# Patient Record
Sex: FEMALE | Race: WHITE | Employment: OTHER | URBAN - METROPOLITAN AREA
[De-identification: names, ages, dates, MRNs, and addresses within clinical notes are randomized per-mention and may not be internally consistent; named-entity substitution may affect disease eponyms.]

---

## 2017-05-11 ENCOUNTER — TELEPHONE (OUTPATIENT)
Dept: FAMILY MEDICINE CLINIC | Facility: CLINIC | Age: 47
End: 2017-05-11

## 2017-05-11 DIAGNOSIS — R30.0 DYSURIA: Primary | ICD-10-CM

## 2017-05-11 NOTE — TELEPHONE ENCOUNTER
DR CHANG  Actions Requested: Patient asked if you can order Urine culture to r/o UTI  Situation/Background   Problem: discomfort with urination, urgency   Onset: few days ago   Associated Symptoms: denied burning with urination, denied fever, no back pain

## 2017-05-11 NOTE — TELEPHONE ENCOUNTER
Pt called, message per Dr given.   Verbalized understanding and compliance  Will complete 5/12/17 in AM

## 2017-05-12 ENCOUNTER — APPOINTMENT (OUTPATIENT)
Dept: LAB | Facility: HOSPITAL | Age: 47
End: 2017-05-12
Attending: INTERNAL MEDICINE
Payer: COMMERCIAL

## 2017-05-12 DIAGNOSIS — R30.0 DYSURIA: ICD-10-CM

## 2017-05-12 PROCEDURE — 81003 URINALYSIS AUTO W/O SCOPE: CPT

## 2017-05-12 PROCEDURE — 87086 URINE CULTURE/COLONY COUNT: CPT

## 2017-08-07 ENCOUNTER — HOSPITAL ENCOUNTER (EMERGENCY)
Facility: HOSPITAL | Age: 47
Discharge: HOME | End: 2017-08-07
Payer: COMMERCIAL

## 2017-08-07 ENCOUNTER — APPOINTMENT (OUTPATIENT)
Dept: ULTRASOUND IMAGING | Facility: HOSPITAL | Age: 47
End: 2017-08-07
Attending: NURSE PRACTITIONER
Payer: COMMERCIAL

## 2017-08-07 VITALS
RESPIRATION RATE: 20 BRPM | OXYGEN SATURATION: 98 % | HEART RATE: 86 BPM | SYSTOLIC BLOOD PRESSURE: 140 MMHG | DIASTOLIC BLOOD PRESSURE: 89 MMHG | TEMPERATURE: 98 F | BODY MASS INDEX: 22.96 KG/M2 | HEIGHT: 69 IN | WEIGHT: 155 LBS

## 2017-08-07 DIAGNOSIS — S86.112A GASTROCNEMIUS MUSCLE RUPTURE, LEFT, INITIAL ENCOUNTER: Primary | ICD-10-CM

## 2017-08-07 PROCEDURE — 99284 EMERGENCY DEPT VISIT MOD MDM: CPT

## 2017-08-07 PROCEDURE — 93971 EXTREMITY STUDY: CPT | Performed by: NURSE PRACTITIONER

## 2017-08-08 ENCOUNTER — TELEPHONE (OUTPATIENT)
Dept: INTERNAL MEDICINE CLINIC | Facility: CLINIC | Age: 47
End: 2017-08-08

## 2017-08-08 ENCOUNTER — OFFICE VISIT (OUTPATIENT)
Dept: INTERNAL MEDICINE CLINIC | Facility: CLINIC | Age: 47
End: 2017-08-08

## 2017-08-08 VITALS
WEIGHT: 171 LBS | BODY MASS INDEX: 24.76 KG/M2 | DIASTOLIC BLOOD PRESSURE: 82 MMHG | SYSTOLIC BLOOD PRESSURE: 129 MMHG | HEART RATE: 82 BPM | HEIGHT: 69.5 IN | TEMPERATURE: 98 F

## 2017-08-08 DIAGNOSIS — S86.112D STRAIN OF GASTROCNEMIUS MUSCLE OF LEFT LOWER EXTREMITY, SUBSEQUENT ENCOUNTER: Primary | ICD-10-CM

## 2017-08-08 PROBLEM — S86.112A STRAIN OF GASTROCNEMIUS MUSCLE OF LEFT LOWER EXTREMITY: Status: ACTIVE | Noted: 2017-08-08

## 2017-08-08 PROCEDURE — 99214 OFFICE O/P EST MOD 30 MIN: CPT | Performed by: INTERNAL MEDICINE

## 2017-08-08 PROCEDURE — 99212 OFFICE O/P EST SF 10 MIN: CPT | Performed by: INTERNAL MEDICINE

## 2017-08-08 NOTE — ED PROVIDER NOTES
Patient Seen in: [facility] AND [facility] Emergency Department    History   Patient presents with:  Musculoskeletal Problem    Stated Complaint: l calf injury x 2 days    HPI    24-year-old female, with a history of anemia and abnormal periods, presents to the Grandmother 48     per NG: cause of death   • Hypertension Maternal Aunt    • Hypertension Maternal Aunt        Smoking status: Passive Smoke Exposure - Never Smoker                                      Packs/day: 0.00      Years: 1.00         Last attempt Course   Labs Reviewed - No data to display    ============================================================  ED Course  ------------------------------------------------------------  MDM     Ultrasound ordered per triage was negative  Ace wrap and crutches-

## 2017-08-08 NOTE — TELEPHONE ENCOUNTER
Pt calling  States she was in ER on 08/07 and will need follow up appt for  torn muscle in calf with two days. No appt are available please advise pt requesting to be added sooner.

## 2017-08-09 ENCOUNTER — TELEPHONE (OUTPATIENT)
Dept: ORTHOPEDICS CLINIC | Facility: CLINIC | Age: 47
End: 2017-08-09

## 2017-08-09 NOTE — TELEPHONE ENCOUNTER
Dr. Dariana Hernandez, this pt was seen in [address] [place] [address] ED on 08/07/17 for left gastrocmemius muscle rupture.  May I add pt to your schedule today at 6pm?

## 2017-08-09 NOTE — TELEPHONE ENCOUNTER
Spoke to pt and she states her left calf is feeling much better. Denies pain at this time. Pain increases to 4/10 with over extension. States calf has \"dramatically improved\" since she went to ED on 08/07/17. Denies any swelling or bruising.  States she h

## 2017-08-09 NOTE — ASSESSMENT & PLAN NOTE
Pain and cramping in the left calf. DVT studies done in the emergency room were negative. Continues to have difficulty with weightbearing and walking. Advised toe-touch walking at this time and use an Ace wrap from mid thigh to mid calf.   Use crutches i

## 2017-08-09 NOTE — PATIENT INSTRUCTIONS
Problem List Items Addressed This Visit        Unprioritized    Strain of gastrocnemius muscle of left lower extremity - Primary     Pain and cramping in the left calf. DVT studies done in the emergency room were negative.   Continues to have difficulty wi

## 2017-08-09 NOTE — PROGRESS NOTES
HPI:    Patient ID: Katty Soares is a 52year old female. Leg Pain    The pain is present in the left upper leg. This is a new problem. The current episode started in the past 7 days (3 days back).  There has been a history of trauma (started with lef sounds are normal.   Musculoskeletal: Normal range of motion. Left knee: Normal.        Right lower leg: She exhibits tenderness. She exhibits no edema. Neurological: She is alert and oriented to person, place, and time. She has normal reflexes.

## 2018-03-28 ENCOUNTER — OFFICE VISIT (OUTPATIENT)
Dept: OBGYN CLINIC | Facility: CLINIC | Age: 48
End: 2018-03-28

## 2018-03-28 VITALS
SYSTOLIC BLOOD PRESSURE: 142 MMHG | HEART RATE: 84 BPM | WEIGHT: 164 LBS | DIASTOLIC BLOOD PRESSURE: 89 MMHG | BODY MASS INDEX: 24 KG/M2

## 2018-03-28 DIAGNOSIS — D25.1 INTRAMURAL LEIOMYOMA OF UTERUS: ICD-10-CM

## 2018-03-28 DIAGNOSIS — Z12.31 VISIT FOR SCREENING MAMMOGRAM: ICD-10-CM

## 2018-03-28 DIAGNOSIS — Z01.419 ENCOUNTER FOR GYNECOLOGICAL EXAMINATION WITHOUT ABNORMAL FINDING: Primary | ICD-10-CM

## 2018-03-28 PROCEDURE — 99396 PREV VISIT EST AGE 40-64: CPT | Performed by: OBSTETRICS & GYNECOLOGY

## 2018-03-29 LAB — HPV I/H RISK 1 DNA SPEC QL NAA+PROBE: NEGATIVE

## 2018-05-06 ENCOUNTER — HOSPITAL ENCOUNTER (OUTPATIENT)
Dept: MAMMOGRAPHY | Facility: HOSPITAL | Age: 48
Discharge: HOME | End: 2018-05-06
Attending: OBSTETRICS & GYNECOLOGY
Payer: COMMERCIAL

## 2018-05-06 DIAGNOSIS — Z12.31 VISIT FOR SCREENING MAMMOGRAM: ICD-10-CM

## 2018-05-06 PROCEDURE — 77067 SCR MAMMO BI INCL CAD: CPT | Performed by: OBSTETRICS & GYNECOLOGY

## 2018-08-21 ENCOUNTER — TELEPHONE (OUTPATIENT)
Dept: INTERNAL MEDICINE CLINIC | Facility: CLINIC | Age: 48
End: 2018-08-21

## 2018-08-24 NOTE — TELEPHONE ENCOUNTER
Pt returned call, states she has been checking her BP and it averages 130/89. Resting HR over 100 at times. Pt states she has been under some stress lately so may be related to increased stress. Pt has had occasional headaches over the last 3 months, also gets occasional sensation or pain in her left arm down to her fingers, states not present at this time. Denies chest pain, back pain, shortness of breath, numbness or tingling, or visual changes. Appt rescheduled to 8/28/18. Advised pt to go to ER if any symptoms noted or pain in arm occurs again, pt agreed with plan of care.

## 2018-08-28 ENCOUNTER — OFFICE VISIT (OUTPATIENT)
Dept: INTERNAL MEDICINE CLINIC | Facility: CLINIC | Age: 48
End: 2018-08-28

## 2018-08-28 VITALS
BODY MASS INDEX: 23.89 KG/M2 | RESPIRATION RATE: 16 BRPM | HEART RATE: 85 BPM | HEIGHT: 69.5 IN | WEIGHT: 165 LBS | SYSTOLIC BLOOD PRESSURE: 159 MMHG | DIASTOLIC BLOOD PRESSURE: 94 MMHG

## 2018-08-28 DIAGNOSIS — E04.9 GOITER: ICD-10-CM

## 2018-08-28 DIAGNOSIS — E78.2 MIXED HYPERLIPIDEMIA: Primary | ICD-10-CM

## 2018-08-28 DIAGNOSIS — Z00.00 ROUTINE PHYSICAL EXAMINATION: ICD-10-CM

## 2018-08-28 DIAGNOSIS — R03.0 ELEVATED BLOOD PRESSURE, SITUATIONAL: ICD-10-CM

## 2018-08-28 PROCEDURE — 99214 OFFICE O/P EST MOD 30 MIN: CPT | Performed by: INTERNAL MEDICINE

## 2018-08-28 RX ORDER — ALPRAZOLAM 0.25 MG/1
0.25 TABLET ORAL NIGHTLY PRN
Qty: 20 TABLET | Refills: 0 | Status: SHIPPED | OUTPATIENT
Start: 2018-08-28 | End: 2019-12-05

## 2018-08-28 RX ORDER — METOPROLOL SUCCINATE 25 MG/1
TABLET, EXTENDED RELEASE ORAL
Qty: 30 TABLET | Refills: 5 | Status: SHIPPED | OUTPATIENT
Start: 2018-08-28 | End: 2019-11-06

## 2018-08-28 RX ORDER — CLONAZEPAM 0.5 MG/1
TABLET ORAL
Qty: 30 TABLET | Refills: 5 | Status: SHIPPED | OUTPATIENT
Start: 2018-08-28 | End: 2018-09-18

## 2018-08-28 NOTE — ASSESSMENT & PLAN NOTE
Patient has been having increasing problems with anxiety, agitation, irritability. She has been having hot flashes. Menstrual cycles remain regular. Last labs were done quite a while back. Blood pressures even upon recheck remained 135/90.   Patient has

## 2018-08-28 NOTE — PATIENT INSTRUCTIONS
Problem List Items Addressed This Visit        Unprioritized    Elevated blood pressure, situational     Patient has been having increasing problems with anxiety, agitation, irritability. She has been having hot flashes. Menstrual cycles remain regular.

## 2018-08-28 NOTE — PROGRESS NOTES
HPI:    Patient ID: Giovanni Hanley is a 50year old female. Blood Pressure   This is a recurrent problem. The current episode started more than 1 month ago. The problem occurs intermittently. The problem has been waxing and waning.  Associated symptoms in tablet Rfl: 5   ClonazePAM 0.5 MG Oral Tab 1 tablet p.o. nightly Disp: 30 tablet Rfl: 5   ALPRAZolam 0.25 MG Oral Tab Take 1 tablet (0.25 mg total) by mouth nightly as needed for Anxiety.  Disp: 20 tablet Rfl: 0     Allergies:  Penicillins                 C Unprioritized    Routine physical examination    Relevant Orders    CBC WITH DIFFERENTIAL WITH PLATELET    COMP METABOLIC PANEL (14)    URINALYSIS, ROUTINE    VITAMIN B12    VITAMIN D, 25-HYDROXY    ASSAY, THYROID STIM HORMONE    LIPID PANEL    Hyperlipide ALPRAZolam 0.25 MG Oral Tab 20 tablet 0      Sig: Take 1 tablet (0.25 mg total) by mouth nightly as needed for Anxiety.            Imaging & Referrals:  US HEAD/NECK ([id number])       DF#[id number]

## 2018-09-08 ENCOUNTER — HOSPITAL ENCOUNTER (OUTPATIENT)
Dept: ULTRASOUND IMAGING | Facility: HOSPITAL | Age: 48
Discharge: HOME | End: 2018-09-08
Attending: INTERNAL MEDICINE
Payer: COMMERCIAL

## 2018-09-08 ENCOUNTER — LAB ENCOUNTER (OUTPATIENT)
Dept: LAB | Facility: HOSPITAL | Age: 48
End: 2018-09-08
Attending: INTERNAL MEDICINE
Payer: COMMERCIAL

## 2018-09-08 DIAGNOSIS — E04.9 GOITER: ICD-10-CM

## 2018-09-08 DIAGNOSIS — Z00.00 ROUTINE PHYSICAL EXAMINATION: ICD-10-CM

## 2018-09-08 DIAGNOSIS — E78.2 MIXED HYPERLIPIDEMIA: ICD-10-CM

## 2018-09-08 LAB
ALBUMIN SERPL BCP-MCNC: 3.8 G/DL (ref 3.5–4.8)
ALBUMIN/GLOB SERPL: 1.4 {RATIO} (ref 1–2)
ALP SERPL-CCNC: 46 U/L (ref 32–100)
ALT SERPL-CCNC: 12 U/L (ref 14–54)
ANION GAP SERPL CALC-SCNC: 7 MMOL/L (ref 0–18)
AST SERPL-CCNC: 16 U/L (ref 15–41)
BASOPHILS # BLD: 0.1 K/UL (ref 0–0.2)
BASOPHILS NFR BLD: 1 %
BILIRUB SERPL-MCNC: 0.5 MG/DL (ref 0.3–1.2)
BILIRUB UR QL: NEGATIVE
BUN SERPL-MCNC: 17 MG/DL (ref 8–20)
BUN/CREAT SERPL: 20 (ref 10–20)
CALCIUM SERPL-MCNC: 9.2 MG/DL (ref 8.5–10.5)
CHLORIDE SERPL-SCNC: 104 MMOL/L (ref 95–110)
CHOLEST SERPL-MCNC: 189 MG/DL (ref 110–200)
CO2 SERPL-SCNC: 28 MMOL/L (ref 22–32)
COLOR UR: YELLOW
CREAT SERPL-MCNC: 0.85 MG/DL (ref 0.5–1.5)
EOSINOPHIL # BLD: 0.1 K/UL (ref 0–0.7)
EOSINOPHIL NFR BLD: 1 %
ERYTHROCYTE [DISTWIDTH] IN BLOOD BY AUTOMATED COUNT: 14 % (ref 11–15)
GLOBULIN PLAS-MCNC: 2.8 G/DL (ref 2.5–3.7)
GLUCOSE SERPL-MCNC: 91 MG/DL (ref 70–99)
GLUCOSE UR-MCNC: NEGATIVE MG/DL
HCT VFR BLD AUTO: 40.2 % (ref 35–48)
HDLC SERPL-MCNC: 58 MG/DL
HGB BLD-MCNC: 13.4 G/DL (ref 12–16)
HGB UR QL STRIP.AUTO: NEGATIVE
KETONES UR-MCNC: NEGATIVE MG/DL
LDLC SERPL CALC-MCNC: 116 MG/DL (ref 0–99)
LYMPHOCYTES # BLD: 1.2 K/UL (ref 1–4)
LYMPHOCYTES NFR BLD: 21 %
MCH RBC QN AUTO: 30.7 PG (ref 27–32)
MCHC RBC AUTO-ENTMCNC: 33.4 G/DL (ref 32–37)
MCV RBC AUTO: 91.7 FL (ref 80–100)
MONOCYTES # BLD: 0.4 K/UL (ref 0–1)
MONOCYTES NFR BLD: 8 %
NEUTROPHILS # BLD AUTO: 3.9 K/UL (ref 1.8–7.7)
NEUTROPHILS NFR BLD: 69 %
NITRITE UR QL STRIP.AUTO: POSITIVE
NONHDLC SERPL-MCNC: 131 MG/DL
OSMOLALITY UR CALC.SUM OF ELEC: 289 MOSM/KG (ref 275–295)
PATIENT FASTING: YES
PH UR: 5 [PH] (ref 5–8)
PLATELET # BLD AUTO: 256 K/UL (ref 140–400)
PMV BLD AUTO: 9.1 FL (ref 7.4–10.3)
POTASSIUM SERPL-SCNC: 3.7 MMOL/L (ref 3.3–5.1)
PROT SERPL-MCNC: 6.6 G/DL (ref 5.9–8.4)
PROT UR-MCNC: NEGATIVE MG/DL
RBC # BLD AUTO: 4.39 M/UL (ref 3.7–5.4)
RBC #/AREA URNS AUTO: 2 /HPF
SODIUM SERPL-SCNC: 139 MMOL/L (ref 136–144)
SP GR UR STRIP: 1.02 (ref 1–1.03)
TRIGL SERPL-MCNC: 74 MG/DL (ref 1–149)
TSH SERPL-ACNC: 2.48 UIU/ML (ref 0.45–5.33)
UROBILINOGEN UR STRIP-ACNC: <2
VIT B12 SERPL-MCNC: 316 PG/ML (ref 181–914)
VIT C UR-MCNC: NEGATIVE MG/DL
WBC # BLD AUTO: 5.6 K/UL (ref 4–11)
WBC #/AREA URNS AUTO: 25 /HPF

## 2018-09-08 PROCEDURE — 82306 VITAMIN D 25 HYDROXY: CPT

## 2018-09-08 PROCEDURE — 76536 US EXAM OF HEAD AND NECK: CPT | Performed by: INTERNAL MEDICINE

## 2018-09-08 PROCEDURE — 84443 ASSAY THYROID STIM HORMONE: CPT

## 2018-09-08 PROCEDURE — 36415 COLL VENOUS BLD VENIPUNCTURE: CPT

## 2018-09-08 PROCEDURE — 81001 URINALYSIS AUTO W/SCOPE: CPT

## 2018-09-08 PROCEDURE — 80061 LIPID PANEL: CPT

## 2018-09-08 PROCEDURE — 85025 COMPLETE CBC W/AUTO DIFF WBC: CPT

## 2018-09-08 PROCEDURE — 82607 VITAMIN B-12: CPT

## 2018-09-08 PROCEDURE — 80053 COMPREHEN METABOLIC PANEL: CPT

## 2018-09-10 LAB — 25(OH)D3 SERPL-MCNC: 26.1 NG/ML

## 2018-09-18 ENCOUNTER — OFFICE VISIT (OUTPATIENT)
Dept: INTERNAL MEDICINE CLINIC | Facility: CLINIC | Age: 48
End: 2018-09-18

## 2018-09-18 VITALS
DIASTOLIC BLOOD PRESSURE: 89 MMHG | RESPIRATION RATE: 16 BRPM | WEIGHT: 167 LBS | SYSTOLIC BLOOD PRESSURE: 127 MMHG | HEART RATE: 69 BPM | HEIGHT: 69.5 IN | BODY MASS INDEX: 24.18 KG/M2

## 2018-09-18 DIAGNOSIS — E78.2 MIXED HYPERLIPIDEMIA: Primary | ICD-10-CM

## 2018-09-18 DIAGNOSIS — E55.9 VITAMIN D DEFICIENCY: ICD-10-CM

## 2018-09-18 DIAGNOSIS — R03.0 ELEVATED BLOOD PRESSURE, SITUATIONAL: ICD-10-CM

## 2018-09-18 DIAGNOSIS — F32.A DEPRESSIVE DISORDER: ICD-10-CM

## 2018-09-18 PROCEDURE — 99212 OFFICE O/P EST SF 10 MIN: CPT | Performed by: INTERNAL MEDICINE

## 2018-09-18 PROCEDURE — 99214 OFFICE O/P EST MOD 30 MIN: CPT | Performed by: INTERNAL MEDICINE

## 2018-09-18 RX ORDER — ERGOCALCIFEROL 1.25 MG/1
50000 CAPSULE ORAL WEEKLY
Qty: 12 CAPSULE | Refills: 1 | Status: SHIPPED | OUTPATIENT
Start: 2018-09-18 | End: 2019-01-30

## 2018-09-18 RX ORDER — ESCITALOPRAM OXALATE 10 MG/1
10 TABLET ORAL DAILY
Qty: 30 TABLET | Refills: 5 | Status: SHIPPED | OUTPATIENT
Start: 2018-09-18 | End: 2019-11-06

## 2018-09-19 NOTE — ASSESSMENT & PLAN NOTE
Patient with anxiety depressive disorder. Multiple stressors at this time. Could not tolerate the clonazepam as she felt drowsy during the day. Advised to start on a trial of Lexapro 5 mg 1 tablet once daily and increase to 10 mg as tolerated.   May need

## 2018-09-19 NOTE — PATIENT INSTRUCTIONS
Problem List Items Addressed This Visit        Unprioritized    Depressive disorder     Patient with anxiety depressive disorder. Multiple stressors at this time. Could not tolerate the clonazepam as she felt drowsy during the day.   Advised to start on a

## 2018-09-19 NOTE — ASSESSMENT & PLAN NOTE
Started on vitamin D 50,000 units once a week for the next 12 weeks.   Started on B12 supplements 1000 mcg once daily

## 2018-09-24 NOTE — PROGRESS NOTES
HPI:    Patient ID: Janice Bennett is a 50year old female. Blood Pressure   This is a recurrent problem. The current episode started in the past 7 days. The problem occurs intermittently.  Progression since onset: elevated bp off and on when stressed,.ha person, place, and time. She appears well-developed and well-nourished. HENT:   Right Ear: External ear normal.   Left Ear: External ear normal.   Nose: Nose normal.   Mouth/Throat: Oropharynx is clear and moist. No oropharyngeal exudate.    Eyes: Conjunc felt drowsy during the day. Advised to start on a trial of Lexapro 5 mg 1 tablet once daily and increase to 10 mg as tolerated. May need to call to start a taper down if considering stopping the medication.   Take the medication in the morning with a meal

## 2019-01-26 ENCOUNTER — PATIENT MESSAGE (OUTPATIENT)
Dept: INTERNAL MEDICINE CLINIC | Facility: CLINIC | Age: 49
End: 2019-01-26

## 2019-01-27 NOTE — TELEPHONE ENCOUNTER
Review pended refill request as it does not fall under a protocol.     Last Rx: 9-18-18  LOV: 9-18-18

## 2019-01-27 NOTE — TELEPHONE ENCOUNTER
From: Gabe Rodrigez  To: Renate Hua MD  Sent: 1/26/2019 9:50 AM CST  Subject: Prescription Question    Can I get a refill for the Vitamin D that was prescribed to me in September?  It does not allow me to automatically request a refill for this one onli

## 2019-01-30 RX ORDER — ERGOCALCIFEROL 1.25 MG/1
50000 CAPSULE ORAL WEEKLY
Qty: 12 CAPSULE | Refills: 1 | Status: SHIPPED | OUTPATIENT
Start: 2019-01-30 | End: 2019-03-01

## 2019-09-04 ENCOUNTER — OFFICE VISIT (OUTPATIENT)
Dept: OBGYN CLINIC | Facility: CLINIC | Age: 49
End: 2019-09-04

## 2019-09-04 VITALS
SYSTOLIC BLOOD PRESSURE: 124 MMHG | DIASTOLIC BLOOD PRESSURE: 82 MMHG | BODY MASS INDEX: 24.47 KG/M2 | HEART RATE: 90 BPM | WEIGHT: 169 LBS | HEIGHT: 69.5 IN

## 2019-09-04 DIAGNOSIS — D21.9 FIBROIDS: ICD-10-CM

## 2019-09-04 DIAGNOSIS — Z01.419 ENCOUNTER FOR GYNECOLOGICAL EXAMINATION WITHOUT ABNORMAL FINDING: Primary | ICD-10-CM

## 2019-09-04 DIAGNOSIS — Z12.31 VISIT FOR SCREENING MAMMOGRAM: ICD-10-CM

## 2019-09-04 DIAGNOSIS — N92.3 INTERMENSTRUAL BLEEDING: ICD-10-CM

## 2019-09-04 PROCEDURE — 99212 OFFICE O/P EST SF 10 MIN: CPT | Performed by: OBSTETRICS & GYNECOLOGY

## 2019-09-04 PROCEDURE — 99396 PREV VISIT EST AGE 40-64: CPT | Performed by: OBSTETRICS & GYNECOLOGY

## 2019-09-04 NOTE — PROGRESS NOTES
Hoang Jon is a 52year old female [id number] Patient's last menstrual period was 08/24/2019. Patient presents with: Annual  Pt had another period 10 days after finishing her menses in June.   She has a h/o fibroids and I rec ordering an US to check on fi drinks        Comment: weekly       Drug use: No      Sexual activity: Yes        Partners: Male        Birth control/protection: Vasectomy    Lifestyle      Physical activity:        Days per week: Not on file        Minutes per session: Not on file escitalopram 10 MG Oral Tab, Take 1 tablet (10 mg total) by mouth daily. , Disp: 30 tablet, Rfl: 5  •  Metoprolol Succinate ER 25 MG Oral Tablet 24 Hr, 1 tablet p.o. daily at bedtime, Disp: 30 tablet, Rfl: 5  •  ALPRAZolam 0.25 MG Oral Tab, Take 1 tablet (0 Appropriate mood and affect    Pelvic Exam:  External Genitalia: normal appearance, hair distribution, and no lesions  Urethral Meatus:  normal in size, location, without lesions and prolapse  Bladder:  No fullness, masses or tenderness  Vagina:  Normal ap

## 2019-10-29 ENCOUNTER — HOSPITAL ENCOUNTER (OUTPATIENT)
Dept: ULTRASOUND IMAGING | Facility: HOSPITAL | Age: 49
Discharge: HOME | End: 2019-10-29
Attending: OBSTETRICS & GYNECOLOGY
Payer: COMMERCIAL

## 2019-10-29 ENCOUNTER — HOSPITAL ENCOUNTER (OUTPATIENT)
Dept: MAMMOGRAPHY | Facility: HOSPITAL | Age: 49
Discharge: HOME | End: 2019-10-29
Attending: OBSTETRICS & GYNECOLOGY
Payer: COMMERCIAL

## 2019-10-29 DIAGNOSIS — D21.9 FIBROIDS: ICD-10-CM

## 2019-10-29 DIAGNOSIS — Z12.31 VISIT FOR SCREENING MAMMOGRAM: ICD-10-CM

## 2019-10-29 DIAGNOSIS — N92.3 INTERMENSTRUAL BLEEDING: ICD-10-CM

## 2019-10-29 PROCEDURE — 77063 BREAST TOMOSYNTHESIS BI: CPT | Performed by: OBSTETRICS & GYNECOLOGY

## 2019-10-29 PROCEDURE — 76856 US EXAM PELVIC COMPLETE: CPT | Performed by: OBSTETRICS & GYNECOLOGY

## 2019-10-29 PROCEDURE — 76830 TRANSVAGINAL US NON-OB: CPT | Performed by: OBSTETRICS & GYNECOLOGY

## 2019-10-29 PROCEDURE — 77067 SCR MAMMO BI INCL CAD: CPT | Performed by: OBSTETRICS & GYNECOLOGY

## 2019-10-31 ENCOUNTER — TELEPHONE (OUTPATIENT)
Dept: OBGYN CLINIC | Facility: CLINIC | Age: 49
End: 2019-10-31

## 2019-10-31 DIAGNOSIS — N92.3 INTERMENSTRUAL BLEEDING: Primary | ICD-10-CM

## 2019-10-31 DIAGNOSIS — N84.0 ENDOMETRIAL POLYP: ICD-10-CM

## 2019-10-31 NOTE — TELEPHONE ENCOUNTER
OB GYN SURGICAL SCHEDULING    Assessment: menorrhagia, possible endometrial polyp on US  Pre-Operative Procedure:  Hysteroscopy - surgical, possible myosure polypectomy    In / on: day 7-10 of cycle    Admission:  Day Surgery    Anesthesia: General    Additional Orders:  Routine Orders    Comments / Orders to Nurse:     Discussed possible complications including but not limited to: Alternatives to surgical intervention discussed with patient in detail., Likely consequences of not undergoing procedure discussed with patient. , anesthesia risks, Ashermann's syndrome, bleeding, infection, injury, internal, need for future surgery, perforation of uterus and surgery may not improve symptoms

## 2019-10-31 NOTE — TELEPHONE ENCOUNTER
Spoke with pt regarding results and rec emb- pt had this before and does not want it done in office- will schedule hys/D&C, poss polypectomy

## 2019-10-31 NOTE — TELEPHONE ENCOUNTER
Pt preferred dates 11/7-11/8. Patient advised I will follow up with her tomorrow with a confirmation.

## 2019-11-01 NOTE — TELEPHONE ENCOUNTER
Patient is scheduled 11/8/19 9:30am hysteroscopy w/possible myosure polypectomy [facility]. Pat orders were routed. Instructions were routed and a detailed message was left on pt's vmail per pt's fyi. Pt asked to c/ to confirm the message was received.

## 2019-11-07 NOTE — TELEPHONE ENCOUNTER
Contacted pt and informed her that [facility] had a family emergency and had to cancel procedure. Patient was understanding and will call the first day of her next cycle to [facility].

## 2019-12-05 ENCOUNTER — ANESTHESIA EVENT (OUTPATIENT)
Dept: SURGERY | Facility: HOSPITAL | Age: 49
End: 2019-12-05
Payer: COMMERCIAL

## 2019-12-05 ENCOUNTER — HOSPITAL ENCOUNTER (OUTPATIENT)
Facility: HOSPITAL | Age: 49
Setting detail: HOSPITAL OUTPATIENT SURGERY
Discharge: HOME | End: 2019-12-05
Attending: OBSTETRICS & GYNECOLOGY | Admitting: OBSTETRICS & GYNECOLOGY
Payer: COMMERCIAL

## 2019-12-05 ENCOUNTER — ANESTHESIA (OUTPATIENT)
Dept: SURGERY | Facility: HOSPITAL | Age: 49
End: 2019-12-05
Payer: COMMERCIAL

## 2019-12-05 VITALS
BODY MASS INDEX: 25.03 KG/M2 | SYSTOLIC BLOOD PRESSURE: 135 MMHG | TEMPERATURE: 98 F | HEIGHT: 69 IN | RESPIRATION RATE: 18 BRPM | OXYGEN SATURATION: 98 % | HEART RATE: 76 BPM | DIASTOLIC BLOOD PRESSURE: 93 MMHG | WEIGHT: 169 LBS

## 2019-12-05 DIAGNOSIS — N92.3 INTERMENSTRUAL BLEEDING: ICD-10-CM

## 2019-12-05 DIAGNOSIS — N84.0 ENDOMETRIAL POLYP: ICD-10-CM

## 2019-12-05 PROBLEM — N93.9 ABNORMAL UTERINE BLEEDING DUE TO ENDOMETRIAL POLYP: Status: ACTIVE | Noted: 2019-12-05

## 2019-12-05 PROBLEM — N92.1 MENORRHAGIA WITH IRREGULAR CYCLE: Status: ACTIVE | Noted: 2019-12-05

## 2019-12-05 PROCEDURE — 0UDB8ZZ EXTRACTION OF ENDOMETRIUM, VIA NATURAL OR ARTIFICIAL OPENING ENDOSCOPIC: ICD-10-PCS | Performed by: OBSTETRICS & GYNECOLOGY

## 2019-12-05 PROCEDURE — 81025 URINE PREGNANCY TEST: CPT

## 2019-12-05 PROCEDURE — 0UB98ZZ EXCISION OF UTERUS, VIA NATURAL OR ARTIFICIAL OPENING ENDOSCOPIC: ICD-10-PCS | Performed by: OBSTETRICS & GYNECOLOGY

## 2019-12-05 PROCEDURE — 88305 TISSUE EXAM BY PATHOLOGIST: CPT | Performed by: OBSTETRICS & GYNECOLOGY

## 2019-12-05 RX ORDER — HYDROMORPHONE HYDROCHLORIDE 1 MG/ML
0.4 INJECTION, SOLUTION INTRAMUSCULAR; INTRAVENOUS; SUBCUTANEOUS EVERY 5 MIN PRN
Status: DISCONTINUED | OUTPATIENT
Start: 2019-12-05 | End: 2019-12-05

## 2019-12-05 RX ORDER — IBUPROFEN 600 MG/1
600 TABLET ORAL EVERY 6 HOURS
Status: DISCONTINUED | OUTPATIENT
Start: 2019-12-05 | End: 2019-12-05

## 2019-12-05 RX ORDER — ONDANSETRON 4 MG/1
4 TABLET, FILM COATED ORAL EVERY 8 HOURS PRN
Status: DISCONTINUED | OUTPATIENT
Start: 2019-12-05 | End: 2019-12-05

## 2019-12-05 RX ORDER — MORPHINE SULFATE 4 MG/ML
4 INJECTION, SOLUTION INTRAMUSCULAR; INTRAVENOUS EVERY 10 MIN PRN
Status: DISCONTINUED | OUTPATIENT
Start: 2019-12-05 | End: 2019-12-05

## 2019-12-05 RX ORDER — MORPHINE SULFATE 4 MG/ML
2 INJECTION, SOLUTION INTRAMUSCULAR; INTRAVENOUS EVERY 10 MIN PRN
Status: DISCONTINUED | OUTPATIENT
Start: 2019-12-05 | End: 2019-12-05

## 2019-12-05 RX ORDER — HYDROCODONE BITARTRATE AND ACETAMINOPHEN 5; 325 MG/1; MG/1
1 TABLET ORAL AS NEEDED
Status: DISCONTINUED | OUTPATIENT
Start: 2019-12-05 | End: 2019-12-05

## 2019-12-05 RX ORDER — HYDROMORPHONE HYDROCHLORIDE 1 MG/ML
0.2 INJECTION, SOLUTION INTRAMUSCULAR; INTRAVENOUS; SUBCUTANEOUS EVERY 5 MIN PRN
Status: DISCONTINUED | OUTPATIENT
Start: 2019-12-05 | End: 2019-12-05

## 2019-12-05 RX ORDER — HYDROCODONE BITARTRATE AND ACETAMINOPHEN 5; 325 MG/1; MG/1
2 TABLET ORAL AS NEEDED
Status: DISCONTINUED | OUTPATIENT
Start: 2019-12-05 | End: 2019-12-05

## 2019-12-05 RX ORDER — NALOXONE HYDROCHLORIDE 0.4 MG/ML
80 INJECTION, SOLUTION INTRAMUSCULAR; INTRAVENOUS; SUBCUTANEOUS AS NEEDED
Status: DISCONTINUED | OUTPATIENT
Start: 2019-12-05 | End: 2019-12-05

## 2019-12-05 RX ORDER — LIDOCAINE HYDROCHLORIDE 10 MG/ML
INJECTION, SOLUTION EPIDURAL; INFILTRATION; INTRACAUDAL; PERINEURAL AS NEEDED
Status: DISCONTINUED | OUTPATIENT
Start: 2019-12-05 | End: 2019-12-05 | Stop reason: SURG

## 2019-12-05 RX ORDER — FAMOTIDINE 20 MG/1
20 TABLET ORAL ONCE
Status: DISCONTINUED | OUTPATIENT
Start: 2019-12-05 | End: 2019-12-05 | Stop reason: HOSPADM

## 2019-12-05 RX ORDER — HYDROMORPHONE HYDROCHLORIDE 1 MG/ML
0.6 INJECTION, SOLUTION INTRAMUSCULAR; INTRAVENOUS; SUBCUTANEOUS EVERY 5 MIN PRN
Status: DISCONTINUED | OUTPATIENT
Start: 2019-12-05 | End: 2019-12-05

## 2019-12-05 RX ORDER — ONDANSETRON 2 MG/ML
INJECTION INTRAMUSCULAR; INTRAVENOUS AS NEEDED
Status: DISCONTINUED | OUTPATIENT
Start: 2019-12-05 | End: 2019-12-05 | Stop reason: SURG

## 2019-12-05 RX ORDER — ONDANSETRON 2 MG/ML
4 INJECTION INTRAMUSCULAR; INTRAVENOUS ONCE AS NEEDED
Status: DISCONTINUED | OUTPATIENT
Start: 2019-12-05 | End: 2019-12-05

## 2019-12-05 RX ORDER — SODIUM CHLORIDE, SODIUM LACTATE, POTASSIUM CHLORIDE, CALCIUM CHLORIDE 600; 310; 30; 20 MG/100ML; MG/100ML; MG/100ML; MG/100ML
INJECTION, SOLUTION INTRAVENOUS CONTINUOUS
Status: DISCONTINUED | OUTPATIENT
Start: 2019-12-05 | End: 2019-12-05

## 2019-12-05 RX ORDER — DEXAMETHASONE SODIUM PHOSPHATE 4 MG/ML
VIAL (ML) INJECTION AS NEEDED
Status: DISCONTINUED | OUTPATIENT
Start: 2019-12-05 | End: 2019-12-05 | Stop reason: SURG

## 2019-12-05 RX ORDER — ONDANSETRON 2 MG/ML
4 INJECTION INTRAMUSCULAR; INTRAVENOUS EVERY 8 HOURS PRN
Status: DISCONTINUED | OUTPATIENT
Start: 2019-12-05 | End: 2019-12-05

## 2019-12-05 RX ORDER — MORPHINE SULFATE 10 MG/ML
6 INJECTION, SOLUTION INTRAMUSCULAR; INTRAVENOUS EVERY 10 MIN PRN
Status: DISCONTINUED | OUTPATIENT
Start: 2019-12-05 | End: 2019-12-05

## 2019-12-05 RX ORDER — ACETAMINOPHEN 500 MG
1000 TABLET ORAL ONCE
Status: COMPLETED | OUTPATIENT
Start: 2019-12-05 | End: 2019-12-05

## 2019-12-05 RX ORDER — HALOPERIDOL 5 MG/ML
0.25 INJECTION INTRAMUSCULAR ONCE AS NEEDED
Status: DISCONTINUED | OUTPATIENT
Start: 2019-12-05 | End: 2019-12-05

## 2019-12-05 RX ORDER — PROCHLORPERAZINE EDISYLATE 5 MG/ML
5 INJECTION INTRAMUSCULAR; INTRAVENOUS ONCE AS NEEDED
Status: DISCONTINUED | OUTPATIENT
Start: 2019-12-05 | End: 2019-12-05

## 2019-12-05 RX ORDER — METOCLOPRAMIDE 10 MG/1
10 TABLET ORAL ONCE
Status: DISCONTINUED | OUTPATIENT
Start: 2019-12-05 | End: 2019-12-05 | Stop reason: HOSPADM

## 2019-12-05 RX ORDER — MIDAZOLAM HYDROCHLORIDE 1 MG/ML
INJECTION INTRAMUSCULAR; INTRAVENOUS AS NEEDED
Status: DISCONTINUED | OUTPATIENT
Start: 2019-12-05 | End: 2019-12-05 | Stop reason: SURG

## 2019-12-05 RX ORDER — KETOROLAC TROMETHAMINE 30 MG/ML
INJECTION, SOLUTION INTRAMUSCULAR; INTRAVENOUS AS NEEDED
Status: DISCONTINUED | OUTPATIENT
Start: 2019-12-05 | End: 2019-12-05 | Stop reason: SURG

## 2019-12-05 RX ADMIN — SODIUM CHLORIDE, SODIUM LACTATE, POTASSIUM CHLORIDE, CALCIUM CHLORIDE: 600; 310; 30; 20 INJECTION, SOLUTION INTRAVENOUS at 12:38:00

## 2019-12-05 RX ADMIN — DEXAMETHASONE SODIUM PHOSPHATE 4 MG: 4 MG/ML VIAL (ML) INJECTION at 12:56:00

## 2019-12-05 RX ADMIN — MIDAZOLAM HYDROCHLORIDE 2 MG: 1 INJECTION INTRAMUSCULAR; INTRAVENOUS at 12:38:00

## 2019-12-05 RX ADMIN — ONDANSETRON 4 MG: 2 INJECTION INTRAMUSCULAR; INTRAVENOUS at 12:56:00

## 2019-12-05 RX ADMIN — KETOROLAC TROMETHAMINE 30 MG: 30 INJECTION, SOLUTION INTRAMUSCULAR; INTRAVENOUS at 13:26:00

## 2019-12-05 RX ADMIN — SODIUM CHLORIDE, SODIUM LACTATE, POTASSIUM CHLORIDE, CALCIUM CHLORIDE: 600; 310; 30; 20 INJECTION, SOLUTION INTRAVENOUS at 13:10:00

## 2019-12-05 RX ADMIN — LIDOCAINE HYDROCHLORIDE 50 MG: 10 INJECTION, SOLUTION EPIDURAL; INFILTRATION; INTRACAUDAL; PERINEURAL at 12:42:00

## 2019-12-05 NOTE — DISCHARGE SUMMARY
[facility]    Discharge Summary    Hannah Brennan Patient Status:  Hospital Outpatient Surgery    1970 MRN [id number]   Location [address] [facility] Attending Nathalie Pinedo MD   Hosp Day # 0 PCP Andi Salcedo MD [place].  · Pelvic rest for 2 weeks, nothing in vagina including sexual activity, tampons, and douching. When should I call my healthcare provider?   Call your healthcare provider if you have:  · Heavy bleeding (more than 1 pad an hour for 2 or more hours)  · A

## 2019-12-05 NOTE — TELEPHONE ENCOUNTER
Called pt to make sure she was aware that the procedure was moved to 12pm 12/4/19. Detailed message was left per pt's fyi.

## 2019-12-05 NOTE — ANESTHESIA PROCEDURE NOTES
Airway  Date/Time: 12/5/2019 12:42 PM  Urgency: Elective    Airway not difficult    General Information and Staff    Patient location during procedure: OR  Anesthesiologist: Ruth Conley DO  Resident/CRNA: Armando Carey CRNA  Performed: CRNA

## 2019-12-05 NOTE — ANESTHESIA POSTPROCEDURE EVALUATION
Patient: Flakito Cespedes    Procedure Summary     Date:  12/05/19 Room / Location:  [address] [place] [address] MAIN OR 08 / [address] [place] [address] MAIN OR    Anesthesia Start:  7711 Anesthesia Stop:  8348    Procedure:  MYOMECTOMY HYSTEROSCOPIC (N/A Vagina ) Diagnosis:       Intermenstrual bleedin

## 2019-12-05 NOTE — ANESTHESIA PREPROCEDURE EVALUATION
Anesthesia PreOp Note    HPI:     Kalia Gtz is a 52year old female who presents for preoperative consultation requested by: Khushi Madison MD    Date of Surgery: 12/5/2019    Procedure(s):   MYOMECTOMY HYSTEROSCOPIC  Indication: Intermenstrual blee 5-20-15     Myosure Polypectomy. ALPRAZolam 0.25 MG Oral Tab, Take 1 tablet (0.25 mg total) by mouth nightly as needed for Anxiety. , Disp: 20 tablet, Rfl: 0, Not Taking      lactated ringers infusion, , Intravenous, Continuous, Jersey Gifford MD, activity:        Days per week: Not on file        Minutes per session: Not on file      Stress: Not on file    Relationships      Social connections:        Talks on phone: Not on file        Gets together: Not on file        Attends [employer] service: No negative ROS and normal exam   Cardiovascular - normal exam  (+) hypertension,     Neuro/Psych    (+)  neuromuscular disease,       GI/Hepatic/Renal - negative ROS     Endo/Other - negative ROS   Abdominal  - normal exam               Anesthesia Plan:   AS

## 2019-12-05 NOTE — OPERATIVE REPORT
[address] RECOVERY  Operative Note     Karey Sabillon Location: OR   CSN [id number] MRN [id number]   Admission Date 12/5/2019 Operation Date 12/5/2019   Attending Physician Dolores Walls MD Operating Physician Vika Morley.  MD Ivett      Pre curretage was performed after sounding was rechecked to make sure no perforation and none noted. Hysteroscope was removed. Single tooth tenaculum was removed. AgNO3 used to make sites hemostatic. Patient was taken to the recovery room in good condition.

## 2019-12-05 NOTE — H&P
[facility] Patient Status:  [facility] Outpatient Surgery    1970 MRN [id number]   Location [address] [facility] Attending Baylee Mendez MD   Hosp Day # 0 PCP Jc Mcginnis MD NG: CAD- CABG at 72   • Heart Disorder Mother    • Stroke Maternal Grandmother 30        per NG: cause of death   • Heart Attack Maternal Grandmother 48        per NG: cause of death   • Hypertension Maternal Aunt    • Hypertension Maternal Aunt      Kong Endometrial Polyp       Plan:  myosure hysteroscopy/D&C, risks,benefits and possible complications discussed and pt consented. Barb Root MD  12/5/2019

## 2019-12-20 ENCOUNTER — TELEPHONE (OUTPATIENT)
Dept: OBGYN CLINIC | Facility: CLINIC | Age: 49
End: 2019-12-20

## 2019-12-20 ENCOUNTER — OFFICE VISIT (OUTPATIENT)
Dept: OBGYN CLINIC | Facility: CLINIC | Age: 49
End: 2019-12-20

## 2019-12-20 VITALS
BODY MASS INDEX: 26 KG/M2 | WEIGHT: 173.81 LBS | HEART RATE: 84 BPM | DIASTOLIC BLOOD PRESSURE: 87 MMHG | SYSTOLIC BLOOD PRESSURE: 138 MMHG

## 2019-12-20 DIAGNOSIS — N89.8 DISCHARGE FROM THE VAGINA: Primary | ICD-10-CM

## 2019-12-20 DIAGNOSIS — N92.3 INTERMENSTRUAL BLEEDING: ICD-10-CM

## 2019-12-20 DIAGNOSIS — Z09 POSTOP CHECK: ICD-10-CM

## 2019-12-20 DIAGNOSIS — N92.0 MENORRHAGIA WITH REGULAR CYCLE: ICD-10-CM

## 2019-12-20 PROCEDURE — 99213 OFFICE O/P EST LOW 20 MIN: CPT | Performed by: OBSTETRICS & GYNECOLOGY

## 2019-12-20 RX ORDER — ACETAMINOPHEN AND CODEINE PHOSPHATE 120; 12 MG/5ML; MG/5ML
0.35 SOLUTION ORAL DAILY
Qty: 3 PACKAGE | Refills: 1 | Status: SHIPPED | OUTPATIENT
Start: 2019-12-20 | End: 2020-06-04

## 2019-12-20 NOTE — PROGRESS NOTES
Flaquita Angelo is a 52year old female [id number] Patient's last menstrual period was 11/26/2019. Patient presents with:  Gyn Exam: P/O f/u   . Had hysteroscopy performed on  Doing well.   Pt menses are heavy and painful on the second day and she soaks an u Laterality Date   •       per N years ago   • D & C  5-20-15     hysteroscopy/D&C,myosure resection of submucous fibroid and polypectomy   • HYSTEROSCOPY     • MYOMECTOMY HYSTEROSCOPIC N/A 2019    Performed by Álvaro Herring MD at [address] [place] [address] Concern: Yes          per NG: kiesha, 2 cups daily        Occupational Exposure: Not Asked        Hobby Hazards: Not Asked        Sleep Concern: Not Asked        Stress Concern: Not Asked        Weight Concern: Not Asked        Special Diet: Not Asked lesions, no abnormal discharge  Cervix:  Normal without tenderness on motion  Uterus: normal in size, contour, position, mobility, without tenderness  Adnexa: normal without masses or tenderness  Perineum: normal  Anus: no hemorroids   Lymph node: no ingui

## 2020-06-02 RX ORDER — ACETAMINOPHEN AND CODEINE PHOSPHATE 120; 12 MG/5ML; MG/5ML
SOLUTION ORAL
Qty: 84 TABLET | Refills: 0 | OUTPATIENT
Start: 2020-06-02

## 2020-06-04 RX ORDER — ACETAMINOPHEN AND CODEINE PHOSPHATE 120; 12 MG/5ML; MG/5ML
0.35 SOLUTION ORAL DAILY
Qty: 3 PACKAGE | Refills: 0 | Status: SHIPPED | OUTPATIENT
Start: 2020-06-04 | End: 2020-09-03

## 2020-08-23 ENCOUNTER — PATIENT MESSAGE (OUTPATIENT)
Dept: OBGYN CLINIC | Facility: CLINIC | Age: 50
End: 2020-08-23

## 2020-09-03 RX ORDER — ACETAMINOPHEN AND CODEINE PHOSPHATE 120; 12 MG/5ML; MG/5ML
0.35 SOLUTION ORAL DAILY
Qty: 2 PACKAGE | Refills: 0 | Status: SHIPPED | OUTPATIENT
Start: 2020-09-03 | End: 2021-04-02

## 2020-09-03 NOTE — TELEPHONE ENCOUNTER
Last annual was 9/4/2019  Last pap - 3/28/2018, normal, neg hpv  Appt scheduled on 11/9/2020.  2 months sent to pharmacy.

## 2020-09-03 NOTE — TELEPHONE ENCOUNTER
Patient scheduled first available with Dr. Amy Flores on 11/9/2020 at 2:20 PM, patient indicates she would need 2 refills, please update at:[phone],thanks.

## 2020-09-16 ENCOUNTER — NURSE TRIAGE (OUTPATIENT)
Dept: INTERNAL MEDICINE CLINIC | Facility: CLINIC | Age: 50
End: 2020-09-16

## 2020-09-16 RX ORDER — CYCLOBENZAPRINE HCL 5 MG
5 TABLET ORAL NIGHTLY
Qty: 20 TABLET | Refills: 2 | Status: SHIPPED | OUTPATIENT
Start: 2020-09-16 | End: 2021-04-02

## 2020-09-16 NOTE — TELEPHONE ENCOUNTER
Action Requested: Summary for Provider     []  Critical Lab, Recommendations Needed  [x] Need Additional Advice  []   FYI    []   Need Orders  [x] Need Medications Sent to Pharmacy  []  Other     SUMMARY: Patent requesting Rx for Skelaxin to pharmacy for s

## 2020-09-16 NOTE — TELEPHONE ENCOUNTER
Rx faxed for muscle relaxant–cyclobenzaprine in place of Skelaxin–restricted by insurance. Advised to please call if not improved. Will need to set up an appointment as has not been seen since 2018.

## 2020-09-16 NOTE — TELEPHONE ENCOUNTER
Spoke with patient ( verified) and relayed Dr. Ziyad Oleary message below--patient verbalizes understanding and agreement. Offered to schedule appt--patient states she will make appt with Dr. Emre Cain via [address] No further questions/concerns at this time.

## 2020-11-09 ENCOUNTER — OFFICE VISIT (OUTPATIENT)
Dept: OBGYN CLINIC | Facility: CLINIC | Age: 50
End: 2020-11-09

## 2020-11-09 VITALS
DIASTOLIC BLOOD PRESSURE: 88 MMHG | BODY MASS INDEX: 24 KG/M2 | WEIGHT: 163 LBS | HEART RATE: 85 BPM | SYSTOLIC BLOOD PRESSURE: 120 MMHG

## 2020-11-09 DIAGNOSIS — Z01.419 ENCOUNTER FOR GYNECOLOGICAL EXAMINATION WITHOUT ABNORMAL FINDING: Primary | ICD-10-CM

## 2020-11-09 DIAGNOSIS — Z12.31 VISIT FOR SCREENING MAMMOGRAM: ICD-10-CM

## 2020-11-09 PROCEDURE — 3074F SYST BP LT 130 MM HG: CPT | Performed by: OBSTETRICS & GYNECOLOGY

## 2020-11-09 PROCEDURE — 3079F DIAST BP 80-89 MM HG: CPT | Performed by: OBSTETRICS & GYNECOLOGY

## 2020-11-09 PROCEDURE — 99396 PREV VISIT EST AGE 40-64: CPT | Performed by: OBSTETRICS & GYNECOLOGY

## 2020-11-09 PROCEDURE — G0438 PPPS, INITIAL VISIT: HCPCS | Performed by: OBSTETRICS & GYNECOLOGY

## 2020-11-09 NOTE — PROGRESS NOTES
Katty Soares is a 48year old female [id number] Patient's last menstrual period was 10/27/2020 (approximate).  who presents for Patient presents with:  Gyn Exam: Annual, pt also wanted to discuss if she should still be on the pill, says that it isnt doing m children: Not on file      Years of education: Not on file      Highest education level: Not on file    Occupational History      Not on file    Social Needs      Financial resource strain: Not on file      Food insecurity        Worry: Not on file Problem Relation Age of Onset   • Heart Attack Father 48   • Heart Disorder Father    • Hypertension Father    • Hypertension Mother    • Heart Attack Mother 61   • Neurological Disorder Mother 48        per NG: MS   • Heart Disease Mother         per NG supraclavicular or axillary adenopathy is noted  Breast: normal without palpable masses, tenderness, asymmetry, nipple discharge, nipple retraction or skin changes  Respiratory:  lungs clear to auscultation bilaterally  Cardiovascular: regular rate and rhy

## 2020-12-12 ENCOUNTER — HOSPITAL ENCOUNTER (OUTPATIENT)
Dept: MAMMOGRAPHY | Facility: HOSPITAL | Age: 50
Discharge: HOME | End: 2020-12-12
Attending: OBSTETRICS & GYNECOLOGY
Payer: COMMERCIAL

## 2020-12-12 DIAGNOSIS — Z12.31 VISIT FOR SCREENING MAMMOGRAM: ICD-10-CM

## 2020-12-12 PROCEDURE — 77067 SCR MAMMO BI INCL CAD: CPT | Performed by: OBSTETRICS & GYNECOLOGY

## 2020-12-12 PROCEDURE — 77063 BREAST TOMOSYNTHESIS BI: CPT | Performed by: OBSTETRICS & GYNECOLOGY

## 2021-04-02 ENCOUNTER — OFFICE VISIT (OUTPATIENT)
Dept: INTERNAL MEDICINE CLINIC | Facility: CLINIC | Age: 51
End: 2021-04-02

## 2021-04-02 VITALS
SYSTOLIC BLOOD PRESSURE: 144 MMHG | DIASTOLIC BLOOD PRESSURE: 88 MMHG | WEIGHT: 158.88 LBS | HEART RATE: 79 BPM | BODY MASS INDEX: 23 KG/M2 | TEMPERATURE: 97 F | HEIGHT: 69.5 IN

## 2021-04-02 DIAGNOSIS — Z78.9 UNKNOWN STATUS OF IMMUNITY TO COVID-19 VIRUS: ICD-10-CM

## 2021-04-02 DIAGNOSIS — Z12.11 SCREENING FOR MALIGNANT NEOPLASM OF COLON: ICD-10-CM

## 2021-04-02 DIAGNOSIS — N84.0 ABNORMAL UTERINE BLEEDING DUE TO ENDOMETRIAL POLYP: ICD-10-CM

## 2021-04-02 DIAGNOSIS — G89.29 HIP PAIN, CHRONIC, UNSPECIFIED LATERALITY: ICD-10-CM

## 2021-04-02 DIAGNOSIS — E04.9 GOITER: ICD-10-CM

## 2021-04-02 DIAGNOSIS — E55.9 VITAMIN D DEFICIENCY: ICD-10-CM

## 2021-04-02 DIAGNOSIS — N93.9 ABNORMAL UTERINE BLEEDING DUE TO ENDOMETRIAL POLYP: ICD-10-CM

## 2021-04-02 DIAGNOSIS — Z00.00 ROUTINE PHYSICAL EXAMINATION: Primary | ICD-10-CM

## 2021-04-02 DIAGNOSIS — D22.9 ATYPICAL NEVI: ICD-10-CM

## 2021-04-02 DIAGNOSIS — E78.2 MIXED HYPERLIPIDEMIA: ICD-10-CM

## 2021-04-02 DIAGNOSIS — M25.559 HIP PAIN, CHRONIC, UNSPECIFIED LATERALITY: ICD-10-CM

## 2021-04-02 PROCEDURE — 3079F DIAST BP 80-89 MM HG: CPT | Performed by: INTERNAL MEDICINE

## 2021-04-02 PROCEDURE — 3077F SYST BP >= 140 MM HG: CPT | Performed by: INTERNAL MEDICINE

## 2021-04-02 PROCEDURE — 3008F BODY MASS INDEX DOCD: CPT | Performed by: INTERNAL MEDICINE

## 2021-04-02 PROCEDURE — 99396 PREV VISIT EST AGE 40-64: CPT | Performed by: INTERNAL MEDICINE

## 2021-04-02 NOTE — ASSESSMENT & PLAN NOTE
Persistent enlargement of the thyroid gland. Small nodules palpable. Repeat ultrasound ordered. Recheck labs have been provided.

## 2021-04-02 NOTE — PATIENT INSTRUCTIONS
Problem List Items Addressed This Visit        Unprioritized    Abnormal uterine bleeding due to endometrial polyp     History of heavy menstrual bleeding, adenomyosis and uterine fibroid. Palpable mild tenderness in the suprapubic area at this time.   Man URINALYSIS, ROUTINE    FREE T4 (FREE THYROXINE)    Vitamin D deficiency     This has been supplemented in the past, recheck labs have been ordered         Relevant Orders    VITAMIN B12    VITAMIN D, 25-HYDROXY      Other Visit Diagnoses     Screening for

## 2021-04-02 NOTE — ASSESSMENT & PLAN NOTE
Normal exam.  Labs as ordered. Skin check normal.  Scattered nevi present, advised to follow-up with dermatology–Dr. Arnel Carlos for an evaluation. Breast exam completed–no palpable abnormalities, discharge from the nipples or axillary adenopathy.   No cervica

## 2021-04-02 NOTE — ASSESSMENT & PLAN NOTE
History of hyperlipidemia, patient has been doing very well with diet control and weight loss. Due for recheck labs–orders provided.   May consider a CT calcium scoring heart scan - call [phone] option #2

## 2021-04-02 NOTE — ASSESSMENT & PLAN NOTE
Gradually worsening bilateral hip pain and stiffness. Family history of osteoarthritis and hip replacements. Hip x-rays provided. Follow-up after completion.

## 2021-04-02 NOTE — PROGRESS NOTES
HPI:   Ting Hernandez is a 46year old female who presents for an Annual Health Visit. Allergies:     Penicillins             RASH    Comment:Other reaction(s): Rash    CURRENT MEDICATIONS   No current outpatient medications on file.       HISTOR use: No    Social History    Social History Narrative      Not on file       REVIEW OF SYSTEMS:     Review of Systems   Constitutional: Negative. HENT: Negative. Eyes: Negative. Respiratory: Negative. Cardiovascular: Negative.     Gastrointestin Chest:      Chest wall: No mass or tenderness. Breasts: Breasts are symmetrical.         Right: No inverted nipple, mass, nipple discharge, skin change or tenderness.          Left: No inverted nipple, mass, nipple discharge, skin change or tendernes ([id number]); Future    Screening for malignant neoplasm of colon  -     GASTRO - INTERNAL    Atypical nevi  -     DERM - INTERNAL    Unknown status of immunity to COVID-19 virus  -     SARS-COV-2 TOTAL ANTIBODY;  Future    Hip pain, chronic, unspecified lat Jefry Chavez, [phone] for an appointment.     Immunizations-    Immunization History   Administered Date(s) Administered   • HEP A 08/15/2003   • Influenza 10/20/2010   • TD 08/15/2003   • TDAP 09/16/2016              Relevant Orders    CBC WITH DIFFER

## 2021-04-02 NOTE — ASSESSMENT & PLAN NOTE
History of heavy menstrual bleeding, adenomyosis and uterine fibroid. Palpable mild tenderness in the suprapubic area at this time. Managed per gynecology. We will follow-up on labs.

## 2021-04-03 ENCOUNTER — LAB ENCOUNTER (OUTPATIENT)
Dept: LAB | Facility: HOSPITAL | Age: 51
End: 2021-04-03
Attending: INTERNAL MEDICINE
Payer: COMMERCIAL

## 2021-04-03 DIAGNOSIS — Z00.00 ROUTINE PHYSICAL EXAMINATION: ICD-10-CM

## 2021-04-03 DIAGNOSIS — E55.9 VITAMIN D DEFICIENCY: ICD-10-CM

## 2021-04-03 DIAGNOSIS — Z78.9 UNKNOWN STATUS OF IMMUNITY TO COVID-19 VIRUS: ICD-10-CM

## 2021-04-03 PROCEDURE — 86769 SARS-COV-2 COVID-19 ANTIBODY: CPT

## 2021-04-03 PROCEDURE — 82306 VITAMIN D 25 HYDROXY: CPT

## 2021-04-03 PROCEDURE — 84443 ASSAY THYROID STIM HORMONE: CPT

## 2021-04-03 PROCEDURE — 85025 COMPLETE CBC W/AUTO DIFF WBC: CPT

## 2021-04-03 PROCEDURE — 81001 URINALYSIS AUTO W/SCOPE: CPT

## 2021-04-03 PROCEDURE — 80061 LIPID PANEL: CPT

## 2021-04-03 PROCEDURE — 80053 COMPREHEN METABOLIC PANEL: CPT

## 2021-04-03 PROCEDURE — 82607 VITAMIN B-12: CPT

## 2021-04-03 PROCEDURE — 36415 COLL VENOUS BLD VENIPUNCTURE: CPT

## 2021-04-03 PROCEDURE — 84439 ASSAY OF FREE THYROXINE: CPT

## 2021-04-09 ENCOUNTER — HOSPITAL ENCOUNTER (OUTPATIENT)
Dept: GENERAL RADIOLOGY | Facility: HOSPITAL | Age: 51
Discharge: HOME | End: 2021-04-09
Attending: INTERNAL MEDICINE
Payer: COMMERCIAL

## 2021-04-09 DIAGNOSIS — G89.29 HIP PAIN, CHRONIC, UNSPECIFIED LATERALITY: ICD-10-CM

## 2021-04-09 DIAGNOSIS — M25.559 HIP PAIN, CHRONIC, UNSPECIFIED LATERALITY: ICD-10-CM

## 2021-04-09 PROCEDURE — 73522 X-RAY EXAM HIPS BI 3-4 VIEWS: CPT | Performed by: INTERNAL MEDICINE

## 2021-04-23 ENCOUNTER — HOSPITAL ENCOUNTER (OUTPATIENT)
Dept: ULTRASOUND IMAGING | Facility: HOSPITAL | Age: 51
Discharge: HOME | End: 2021-04-23
Attending: INTERNAL MEDICINE
Payer: COMMERCIAL

## 2021-04-23 DIAGNOSIS — E04.9 GOITER: ICD-10-CM

## 2021-04-23 PROCEDURE — 76536 US EXAM OF HEAD AND NECK: CPT | Performed by: INTERNAL MEDICINE

## 2021-04-28 ENCOUNTER — HOSPITAL ENCOUNTER (OUTPATIENT)
Dept: CT IMAGING | Facility: HOSPITAL | Age: 51
Discharge: HOME | End: 2021-04-28
Attending: INTERNAL MEDICINE

## 2021-04-28 DIAGNOSIS — Z13.9 ENCOUNTER FOR SCREENING: ICD-10-CM

## 2021-05-24 ENCOUNTER — HOSPITAL ENCOUNTER (OUTPATIENT)
Dept: ULTRASOUND IMAGING | Facility: HOSPITAL | Age: 51
Discharge: HOME | End: 2021-05-24
Attending: INTERNAL MEDICINE

## 2021-05-24 ENCOUNTER — PATIENT MESSAGE (OUTPATIENT)
Dept: INTERNAL MEDICINE CLINIC | Facility: CLINIC | Age: 51
End: 2021-05-24

## 2021-05-24 DIAGNOSIS — Z13.9 ENCOUNTER FOR SCREENING: ICD-10-CM

## 2021-05-24 NOTE — TELEPHONE ENCOUNTER
From: Park Leon  To: Julio César Mitchell MD  Sent: 5/24/2021 2:13 PM CDT  Subject: Prescription Question    Can I please get a refill of the Xanax? We are planning a big trip and this helps me on my flights.   Thank you,  Dave Andino

## 2021-05-26 RX ORDER — ALPRAZOLAM 0.25 MG/1
0.25 TABLET ORAL NIGHTLY PRN
Qty: 20 TABLET | Refills: 0 | Status: SHIPPED | OUTPATIENT
Start: 2021-05-26 | End: 2022-01-20

## 2021-08-02 ENCOUNTER — PATIENT MESSAGE (OUTPATIENT)
Dept: INTERNAL MEDICINE CLINIC | Facility: CLINIC | Age: 51
End: 2021-08-02

## 2021-08-02 ENCOUNTER — OFFICE VISIT (OUTPATIENT)
Dept: GASTROENTEROLOGY | Facility: CLINIC | Age: 51
End: 2021-08-02

## 2021-08-02 VITALS
SYSTOLIC BLOOD PRESSURE: 143 MMHG | BODY MASS INDEX: 23.8 KG/M2 | HEART RATE: 73 BPM | HEIGHT: 69.5 IN | WEIGHT: 164.38 LBS | DIASTOLIC BLOOD PRESSURE: 91 MMHG

## 2021-08-02 DIAGNOSIS — K59.00 CONSTIPATION, UNSPECIFIED CONSTIPATION TYPE: ICD-10-CM

## 2021-08-02 DIAGNOSIS — Z12.11 ENCOUNTER FOR SCREENING COLONOSCOPY: Primary | ICD-10-CM

## 2021-08-02 DIAGNOSIS — K64.8 OTHER HEMORRHOIDS: ICD-10-CM

## 2021-08-02 PROCEDURE — 99243 OFF/OP CNSLTJ NEW/EST LOW 30: CPT | Performed by: INTERNAL MEDICINE

## 2021-08-02 PROCEDURE — 3080F DIAST BP >= 90 MM HG: CPT | Performed by: INTERNAL MEDICINE

## 2021-08-02 PROCEDURE — 3077F SYST BP >= 140 MM HG: CPT | Performed by: INTERNAL MEDICINE

## 2021-08-02 PROCEDURE — 3008F BODY MASS INDEX DOCD: CPT | Performed by: INTERNAL MEDICINE

## 2021-08-02 NOTE — PATIENT INSTRUCTIONS
# Average Risk screening: patient is considered average risk for colon cancer (Denies family hx of colon cancer) and it is appropriate to proceed with screening colonoscopy.  Patient is currently asymptomatic and denies diarrhea, hematochezia, thin-stools o (currently cologuard every 3 years). Cologuard performance when used for repeat testing has not been evaluated or established.

## 2021-08-03 ENCOUNTER — TELEPHONE (OUTPATIENT)
Dept: GASTROENTEROLOGY | Facility: CLINIC | Age: 51
End: 2021-08-03

## 2021-08-03 NOTE — TELEPHONE ENCOUNTER
Cologuard forms were signed and faxed to [facility], at [phone]  As per  recommendation below:  Plan Cologuard given avg risk for colon cancer

## 2021-08-03 NOTE — TELEPHONE ENCOUNTER
From: Jeancarlos Paz  To: Alfreda Mack MD  Sent: 8/2/2021 2:36 PM CDT  Subject: Non-Urgent Medical Question    Can I get a shingles vaccine? My co-worker is my age and has shingles. And my  had it last year.  Do I need to schedule this or get a pre

## 2021-08-20 ENCOUNTER — TELEPHONE (OUTPATIENT)
Dept: GASTROENTEROLOGY | Facility: CLINIC | Age: 51
End: 2021-08-20

## 2021-08-20 NOTE — TELEPHONE ENCOUNTER
Dr. Tran Mon,     Received cologuard results completed 08/13/2021 via fax. Negative result. Please advise on recall and/or recommendations if necessary. Thank you! Sent results to scan.

## 2021-08-24 NOTE — TELEPHONE ENCOUNTER
Entered into Epic. Recall crc screening for 3 years per Dr. Rekha Carvajal. Last cologuard completed 08/13/2021. Recall entered into Patient Outreach for 08/13/2024. HM updated.

## 2022-01-20 ENCOUNTER — OFFICE VISIT (OUTPATIENT)
Dept: INTERNAL MEDICINE CLINIC | Facility: CLINIC | Age: 52
End: 2022-01-20
Payer: COMMERCIAL

## 2022-01-20 VITALS
WEIGHT: 165 LBS | HEIGHT: 69.5 IN | BODY MASS INDEX: 23.89 KG/M2 | HEART RATE: 81 BPM | DIASTOLIC BLOOD PRESSURE: 82 MMHG | SYSTOLIC BLOOD PRESSURE: 130 MMHG

## 2022-01-20 DIAGNOSIS — I77.810 DILATION OF THORACIC AORTA (HCC): Primary | ICD-10-CM

## 2022-01-20 DIAGNOSIS — F41.9 ANXIETY: ICD-10-CM

## 2022-01-20 DIAGNOSIS — D64.89 ANEMIA DUE TO OTHER CAUSE, NOT CLASSIFIED: ICD-10-CM

## 2022-01-20 DIAGNOSIS — R07.89 CHEST TIGHTNESS: ICD-10-CM

## 2022-01-20 PROCEDURE — 3079F DIAST BP 80-89 MM HG: CPT | Performed by: NURSE PRACTITIONER

## 2022-01-20 PROCEDURE — 3075F SYST BP GE 130 - 139MM HG: CPT | Performed by: NURSE PRACTITIONER

## 2022-01-20 PROCEDURE — 99214 OFFICE O/P EST MOD 30 MIN: CPT | Performed by: NURSE PRACTITIONER

## 2022-01-20 PROCEDURE — 3008F BODY MASS INDEX DOCD: CPT | Performed by: NURSE PRACTITIONER

## 2022-01-20 RX ORDER — LISINOPRIL 2.5 MG/1
2.5 TABLET ORAL DAILY
Qty: 30 TABLET | Refills: 5 | Status: SHIPPED | OUTPATIENT
Start: 2022-01-20 | End: 2022-07-19

## 2022-01-20 RX ORDER — ALPRAZOLAM 0.25 MG/1
0.25 TABLET ORAL NIGHTLY PRN
Qty: 20 TABLET | Refills: 0 | Status: SHIPPED | OUTPATIENT
Start: 2022-01-20

## 2022-01-20 NOTE — ASSESSMENT & PLAN NOTE
Patient has been having increasing problems with anxiety, agitation, irritability. She has been having hot flashes. Menstrual cycles remain regular but heavy. Last CBC showing hemoglobin of 11.3. Blood pressures even upon recheck remained 130/90.   Mary De Paz

## 2022-01-20 NOTE — PROGRESS NOTES
HPI:    Patient ID: Yovany Naranjo is a 46year old female. HPI Hypertension  Patient has family hx of CAD and HTN. I am meeting this patient for the first time. She has issues with anxiety and menopausal symptoms. She has been having heavy periods. per NG: kiesha, 2 cups daily         Review of Systems   Constitutional: Negative for chills, fatigue and fever.    HENT: Negative for congestion, ear discharge, ear pain, facial swelling, hearing loss, postnasal drip, sinus pressure, sore throat and trouble Abdominal:      General: Bowel sounds are normal. There is no distension. Palpations: Abdomen is soft. There is no mass. Tenderness: There is no abdominal tenderness. There is no right CVA tenderness, left CVA tenderness or guarding.    Musculos 04/03/2021     04/03/2021    CO2 28.0 04/03/2021      No results found for: EAG, A1C   Lab Results   Component Value Date    CHOLEST 194 04/03/2021    TRIG 83 04/03/2021    HDL 53 04/03/2021     (H) 04/03/2021    VLDL 17 04/03/2021    TCHDLRAT

## 2022-01-20 NOTE — ASSESSMENT & PLAN NOTE
Patient states she has regular periods and for two days she bleeds heavy. Last CBC was 11.3. Plan  Patient called and we should repeat a CBC.

## 2022-01-31 ENCOUNTER — HOSPITAL ENCOUNTER (OUTPATIENT)
Dept: CT IMAGING | Facility: HOSPITAL | Age: 52
Discharge: HOME | End: 2022-01-31
Attending: NURSE PRACTITIONER
Payer: COMMERCIAL

## 2022-01-31 ENCOUNTER — LAB ENCOUNTER (OUTPATIENT)
Dept: LAB | Facility: HOSPITAL | Age: 52
End: 2022-01-31
Attending: NURSE PRACTITIONER
Payer: COMMERCIAL

## 2022-01-31 DIAGNOSIS — I77.810 DILATION OF THORACIC AORTA (HCC): ICD-10-CM

## 2022-01-31 DIAGNOSIS — D64.89 ANEMIA DUE TO OTHER CAUSE, NOT CLASSIFIED: ICD-10-CM

## 2022-01-31 LAB
BASOPHILS NFR BLD AUTO: 0.8 %
CREAT BLD-MCNC: 0.8 MG/DL
DEPRECATED RDW RBC AUTO: 47.8 FL (ref 35.1–46.3)
EOSINOPHIL # BLD AUTO: 0.07 X10(3) UL (ref 0–0.7)
EOSINOPHIL NFR BLD AUTO: 1.3 %
ERYTHROCYTE [DISTWIDTH] IN BLOOD BY AUTOMATED COUNT: 16 % (ref 11–15)
HCT VFR BLD AUTO: 34.7 %
HGB BLD-MCNC: 10 G/DL
IMM GRANULOCYTES # BLD AUTO: 0.01 X10(3) UL (ref 0–1)
IMM GRANULOCYTES NFR BLD: 0.2 %
LYMPHOCYTES # BLD AUTO: 1.23 X10(3) UL (ref 1–4)
LYMPHOCYTES NFR BLD AUTO: 23.1 %
MCH RBC QN AUTO: 23.7 PG (ref 26–34)
MCHC RBC AUTO-ENTMCNC: 28.8 G/DL (ref 31–37)
MCV RBC AUTO: 82.2 FL
MONOCYTES # BLD AUTO: 0.55 X10(3) UL (ref 0.1–1)
MONOCYTES NFR BLD AUTO: 10.3 %
NEUTROPHILS # BLD AUTO: 3.42 X10 (3) UL (ref 1.5–7.7)
NEUTROPHILS # BLD AUTO: 3.42 X10(3) UL (ref 1.5–7.7)
PLATELET # BLD AUTO: 309 10(3)UL (ref 150–450)
RBC # BLD AUTO: 4.22 X10(6)UL
WBC # BLD AUTO: 5.3 X10(3) UL (ref 4–11)

## 2022-01-31 PROCEDURE — 71275 CT ANGIOGRAPHY CHEST: CPT | Performed by: NURSE PRACTITIONER

## 2022-01-31 PROCEDURE — 82565 ASSAY OF CREATININE: CPT

## 2022-01-31 PROCEDURE — 85025 COMPLETE CBC W/AUTO DIFF WBC: CPT

## 2022-01-31 PROCEDURE — 36415 COLL VENOUS BLD VENIPUNCTURE: CPT

## 2022-01-31 PROCEDURE — 74174 CTA ABD&PLVS W/CONTRAST: CPT | Performed by: NURSE PRACTITIONER

## 2022-02-18 ENCOUNTER — OFFICE VISIT (OUTPATIENT)
Dept: INTERNAL MEDICINE CLINIC | Facility: CLINIC | Age: 52
End: 2022-02-18

## 2022-02-18 VITALS — SYSTOLIC BLOOD PRESSURE: 146 MMHG | DIASTOLIC BLOOD PRESSURE: 94 MMHG | HEART RATE: 86 BPM

## 2022-02-18 PROBLEM — D50.9 IRON DEFICIENCY ANEMIA: Status: ACTIVE | Noted: 2022-01-20

## 2022-02-18 PROBLEM — N92.1 MENORRHAGIA WITH IRREGULAR CYCLE: Status: RESOLVED | Noted: 2019-12-05 | Resolved: 2022-02-18

## 2022-02-18 PROBLEM — D25.9 FIBROID UTERUS: Status: ACTIVE | Noted: 2022-02-18

## 2022-02-18 PROBLEM — I10 PRIMARY HYPERTENSION: Status: ACTIVE | Noted: 2018-08-28

## 2022-02-18 PROCEDURE — 3077F SYST BP >= 140 MM HG: CPT | Performed by: INTERNAL MEDICINE

## 2022-02-18 PROCEDURE — 99214 OFFICE O/P EST MOD 30 MIN: CPT | Performed by: INTERNAL MEDICINE

## 2022-02-18 PROCEDURE — 3080F DIAST BP >= 90 MM HG: CPT | Performed by: INTERNAL MEDICINE

## 2022-02-18 RX ORDER — METOPROLOL SUCCINATE 25 MG/1
TABLET, EXTENDED RELEASE ORAL
Qty: 30 TABLET | Refills: 5 | Status: SHIPPED | OUTPATIENT
Start: 2022-02-18

## 2022-02-18 NOTE — ASSESSMENT & PLAN NOTE
Iron deficiency anemia, hemoglobin is at 10.7. She has been started on iron supplements. Recheck labs have been ordered. Consider GYN evaluation for management of anemia/fibroids.

## 2022-03-15 ENCOUNTER — HOSPITAL ENCOUNTER (OUTPATIENT)
Dept: ULTRASOUND IMAGING | Facility: HOSPITAL | Age: 52
Discharge: HOME | End: 2022-03-15
Attending: INTERNAL MEDICINE
Payer: COMMERCIAL

## 2022-03-15 DIAGNOSIS — D25.9 UTERINE LEIOMYOMA, UNSPECIFIED LOCATION: ICD-10-CM

## 2022-03-15 PROCEDURE — 76856 US EXAM PELVIC COMPLETE: CPT | Performed by: INTERNAL MEDICINE

## 2022-03-15 PROCEDURE — 76830 TRANSVAGINAL US NON-OB: CPT | Performed by: INTERNAL MEDICINE

## 2022-03-22 ENCOUNTER — PATIENT MESSAGE (OUTPATIENT)
Dept: OBGYN CLINIC | Facility: CLINIC | Age: 52
End: 2022-03-22

## 2022-03-22 NOTE — TELEPHONE ENCOUNTER
Sent to [facility] for recs. See the messages below. Ultrasound from 3/15/21 below    CONCLUSION:   1. Large fibroid uterus larger than on October 29, 2019.   2. Right ovary not visualized.    3. 2.24 cm simple left ovarian cyst

## 2022-03-22 NOTE — TELEPHONE ENCOUNTER
From: Meme Brown  To: Barb Root MD  Sent: 3/22/2022 9:40 AM CDT  Subject: Spotting After Ultrasound    I had a transvaginal ultrasound on March 15th ordered by Dr. Clay Garg and I have been spotting daily ever since (one week). This has not happened in the past from previous ultrasounds.  Is this normal?

## 2022-03-24 NOTE — TELEPHONE ENCOUNTER
Please have her make an appt to be seen, it may be that her cervix was irritated by the probe and that caused the spotting,  her fibroids are not impinging on her uterine lining so I dont think the fibroid is the source but they are slightly larger. We can discuss management options at her visit.

## 2022-03-28 NOTE — TELEPHONE ENCOUNTER
Pt accepts appt on 3/31. States still has spotting and now is not sure if she will be getting her period soon. Advised pt to let us know if menses starts and we will cancel appt. Pt agrees.

## 2024-05-29 ENCOUNTER — TELEPHONE (OUTPATIENT)
Facility: CLINIC | Age: 54
End: 2024-05-29

## 2024-05-29 DIAGNOSIS — Z12.11 COLON CANCER SCREENING: Primary | ICD-10-CM

## 2024-05-29 NOTE — TELEPHONE ENCOUNTER
Patient outreach message received:    Recall crc screening for 3 years per Dr. Cool. Last cologuard completed 08/13/2021. Recall entered into Patient Outreach for 08/13/2024

## 2024-05-30 NOTE — TELEPHONE ENCOUNTER
3 year Cologuard recall received. Order pended for you to review and sign off if appropriate. Thank you.

## 2024-06-03 NOTE — TELEPHONE ENCOUNTER
Cologuard order and facesheet faxed to [employer]. Received confirmation that fax was transmitted successfully.  Fax #[phone]

## 2024-06-04 NOTE — TELEPHONE ENCOUNTER
RN called pt, no answer so left message stating that a [employer] message with more details was sent.     MyChart message stated that a cologuard stool test was ordered. GI office number provided for any needs.

## 2024-06-05 NOTE — TELEPHONE ENCOUNTER
MyChart message not read by pt yet. RN called pt, no answer so left message instructing her to check her MyChart messages and call GI office for any questions or needs. GI office number provided.

## 2024-11-12 ENCOUNTER — HOSPITAL ENCOUNTER (EMERGENCY)
Facility: HOSPITAL | Age: 54
Discharge: HOME | End: 2024-11-12
Attending: EMERGENCY MEDICINE
Payer: COMMERCIAL

## 2024-11-12 ENCOUNTER — APPOINTMENT (OUTPATIENT)
Dept: CT IMAGING | Facility: HOSPITAL | Age: 54
End: 2024-11-12
Attending: EMERGENCY MEDICINE
Payer: COMMERCIAL

## 2024-11-12 VITALS
RESPIRATION RATE: 18 BRPM | TEMPERATURE: 98 F | OXYGEN SATURATION: 98 % | HEART RATE: 59 BPM | DIASTOLIC BLOOD PRESSURE: 80 MMHG | SYSTOLIC BLOOD PRESSURE: 127 MMHG

## 2024-11-12 DIAGNOSIS — N20.0 KIDNEY STONE: Primary | ICD-10-CM

## 2024-11-12 DIAGNOSIS — N30.00 ACUTE CYSTITIS WITHOUT HEMATURIA: ICD-10-CM

## 2024-11-12 LAB
ANION GAP SERPL CALC-SCNC: 8 MMOL/L (ref 0–18)
BASOPHILS # BLD AUTO: 0.08 X10(3) UL (ref 0–0.2)
BASOPHILS NFR BLD AUTO: 1.3 %
BILIRUB UR QL: NEGATIVE
BUN BLD-MCNC: 20 MG/DL (ref 9–23)
BUN/CREAT SERPL: 21.5 (ref 10–20)
CALCIUM BLD-MCNC: 10 MG/DL (ref 8.7–10.4)
CHLORIDE SERPL-SCNC: 109 MMOL/L (ref 98–112)
CO2 SERPL-SCNC: 25 MMOL/L (ref 21–32)
COLOR UR: YELLOW
CREAT BLD-MCNC: 0.93 MG/DL
DEPRECATED RDW RBC AUTO: 41.1 FL (ref 35.1–46.3)
EGFRCR SERPLBLD CKD-EPI 2021: 73 ML/MIN/1.73M2 (ref 60–?)
EOSINOPHIL # BLD AUTO: 0.09 X10(3) UL (ref 0–0.7)
EOSINOPHIL NFR BLD AUTO: 1.4 %
ERYTHROCYTE [DISTWIDTH] IN BLOOD BY AUTOMATED COUNT: 12.5 % (ref 11–15)
GLUCOSE BLD-MCNC: 142 MG/DL (ref 70–99)
GLUCOSE UR-MCNC: NEGATIVE MG/DL
HCT VFR BLD AUTO: 42.4 %
HGB BLD-MCNC: 14.5 G/DL
IMM GRANULOCYTES # BLD AUTO: 0.01 X10(3) UL (ref 0–1)
IMM GRANULOCYTES NFR BLD: 0.2 %
LYMPHOCYTES # BLD AUTO: 1.69 X10(3) UL (ref 1–4)
LYMPHOCYTES NFR BLD AUTO: 27.1 %
MCH RBC QN AUTO: 30.9 PG (ref 26–34)
MCHC RBC AUTO-ENTMCNC: 34.2 G/DL (ref 31–37)
MCV RBC AUTO: 90.4 FL
MONOCYTES # BLD AUTO: 0.6 X10(3) UL (ref 0.1–1)
MONOCYTES NFR BLD AUTO: 9.6 %
NEUTROPHILS # BLD AUTO: 3.76 X10 (3) UL (ref 1.5–7.7)
NEUTROPHILS # BLD AUTO: 3.76 X10(3) UL (ref 1.5–7.7)
NEUTROPHILS NFR BLD AUTO: 60.4 %
NITRITE UR QL STRIP.AUTO: POSITIVE
OSMOLALITY SERPL CALC.SUM OF ELEC: 299 MOSM/KG (ref 275–295)
PH UR: 7 [PH] (ref 5–8)
PLATELET # BLD AUTO: 283 10(3)UL (ref 150–450)
POTASSIUM SERPL-SCNC: 3.6 MMOL/L (ref 3.5–5.1)
RBC # BLD AUTO: 4.69 X10(6)UL
SODIUM SERPL-SCNC: 142 MMOL/L (ref 136–145)
SP GR UR STRIP: 1.02 (ref 1–1.03)
UROBILINOGEN UR STRIP-ACNC: 0.2
WBC # BLD AUTO: 6.2 X10(3) UL (ref 4–11)
YEAST UR QL: PRESENT /HPF
YEAST UR QL: PRESENT /HPF

## 2024-11-12 PROCEDURE — 87186 SC STD MICRODIL/AGAR DIL: CPT | Performed by: EMERGENCY MEDICINE

## 2024-11-12 PROCEDURE — 85025 COMPLETE CBC W/AUTO DIFF WBC: CPT | Performed by: EMERGENCY MEDICINE

## 2024-11-12 PROCEDURE — 81001 URINALYSIS AUTO W/SCOPE: CPT | Performed by: EMERGENCY MEDICINE

## 2024-11-12 PROCEDURE — 99284 EMERGENCY DEPT VISIT MOD MDM: CPT

## 2024-11-12 PROCEDURE — 96365 THER/PROPH/DIAG IV INF INIT: CPT

## 2024-11-12 PROCEDURE — 99285 EMERGENCY DEPT VISIT HI MDM: CPT

## 2024-11-12 PROCEDURE — 80048 BASIC METABOLIC PNL TOTAL CA: CPT | Performed by: EMERGENCY MEDICINE

## 2024-11-12 PROCEDURE — 96361 HYDRATE IV INFUSION ADD-ON: CPT

## 2024-11-12 PROCEDURE — 87086 URINE CULTURE/COLONY COUNT: CPT | Performed by: EMERGENCY MEDICINE

## 2024-11-12 PROCEDURE — 74176 CT ABD & PELVIS W/O CONTRAST: CPT | Performed by: EMERGENCY MEDICINE

## 2024-11-12 PROCEDURE — 87077 CULTURE AEROBIC IDENTIFY: CPT | Performed by: EMERGENCY MEDICINE

## 2024-11-12 PROCEDURE — 81015 MICROSCOPIC EXAM OF URINE: CPT | Performed by: EMERGENCY MEDICINE

## 2024-11-12 PROCEDURE — 96375 TX/PRO/DX INJ NEW DRUG ADDON: CPT

## 2024-11-12 RX ORDER — MORPHINE SULFATE 4 MG/ML
4 INJECTION, SOLUTION INTRAMUSCULAR; INTRAVENOUS ONCE
Status: COMPLETED | OUTPATIENT
Start: 2024-11-12 | End: 2024-11-12

## 2024-11-12 RX ORDER — ONDANSETRON 2 MG/ML
4 INJECTION INTRAMUSCULAR; INTRAVENOUS ONCE
Status: COMPLETED | OUTPATIENT
Start: 2024-11-12 | End: 2024-11-12

## 2024-11-12 RX ORDER — CEPHALEXIN 500 MG/1
500 CAPSULE ORAL 3 TIMES DAILY
Qty: 21 CAPSULE | Refills: 0 | Status: SHIPPED | OUTPATIENT
Start: 2024-11-12 | End: 2024-11-19

## 2024-11-12 RX ORDER — KETOROLAC TROMETHAMINE 15 MG/ML
15 INJECTION, SOLUTION INTRAMUSCULAR; INTRAVENOUS ONCE
Status: COMPLETED | OUTPATIENT
Start: 2024-11-12 | End: 2024-11-12

## 2024-11-12 RX ORDER — ONDANSETRON 4 MG/1
4 TABLET, ORALLY DISINTEGRATING ORAL EVERY 4 HOURS PRN
Qty: 12 TABLET | Refills: 0 | Status: SHIPPED | OUTPATIENT
Start: 2024-11-12 | End: 2024-11-19

## 2024-11-12 RX ORDER — KETOROLAC TROMETHAMINE 10 MG/1
10 TABLET, FILM COATED ORAL EVERY 6 HOURS PRN
Qty: 15 TABLET | Refills: 0 | Status: SHIPPED | OUTPATIENT
Start: 2024-11-12 | End: 2024-11-19

## 2024-11-12 NOTE — ED PROVIDER NOTES
Patient Seen in: [facility] Emergency Department    History     Chief Complaint   Patient presents with    Flank Pain     Stated Complaint: abd pain    Patient complains of l flank pain that beganthis morning.  Pain is 9/10.  Associated with nausea.  No fever/chills.  Patient denies hematuria.  Patient denies  hx of kidney stones      Past Medical History:    Abnormal uterine bleeding    Heavy Periods    Anemia    Slightly Anemic    Anxiety state    Bulge of cervical disc without myelopathy    C5-C6    Endometrial polyp    Hypertension during pregnancy (HCC)    per NG: preeclampsia and htn with pregnancy    Lipid screening    per NG    Menorrhagia    Pre-eclampsia (HCC)    per NG: preeclampsia and htn with pregnancy    Visual impairment    wears glasses/contact    Vitamin D deficiency       Past Surgical History:   Procedure Laterality Date          per N years ago    D & c  -15     hysteroscopy/D&C,myosure resection of submucous fibroid and polypectomy    Hysteroscopy      Other surgical history  5-20-15     Myosure Polypectomy.             Family History   Problem Relation Age of Onset    Heart Attack Father 50    Heart Disorder Father     Hypertension Father     Hypertension Mother     Heart Attack Mother 60    Neurological Disorder Mother 50        per NG: MS    Heart Disease Mother         per NG: CAD- CABG at 65    Heart Disorder Mother     Stroke Maternal Grandmother 30        per NG: cause of death    Heart Attack Maternal Grandmother 50        per NG: cause of death    Hypertension Maternal Aunt     Hypertension Maternal Aunt        Social History     Socioeconomic History    Marital status:    Tobacco Use    Smoking status: Passive Smoke Exposure - Never Smoker    Smokeless tobacco: Never   Vaping Use    Vaping status: Never Used   Substance and Sexual Activity    Alcohol use: Yes     Alcohol/week: 0.0 standard drinks of alcohol     Comment: weekly     Drug use: No     Sexual activity: Yes     Partners: Male     Birth control/protection: Vasectomy   Other Topics Concern    Caffeine Concern Yes     Comment: per NG: kiesha, 2 cups daily       Review of Systems    Positive for stated complaint: abd pain  Other systems are as noted in HPI.  Constitutional and vital signs reviewed.      All other systems reviewed and negative except as noted above.    PSFH elements reviewed from today and agreed except as otherwise stated in HPI.    Physical Exam     ED Triage Vitals [11/12/24 0815]   /84   Pulse 60   Resp 18   Temp 98 °F (36.7 °C)   Temp src    SpO2 100 %   O2 Device None (Room air)       Current:/80   Pulse 59   Temp 98 °F (36.7 °C)   Resp 18   LMP 01/11/2022 (Exact Date)   SpO2 98%           General Appearance: appears uncomfortable, in pain,  Eyes: pupils equal and round no pallor or injection  ENT, Mouth: mucous membranes moist  Respiratory: there are no retractions, lungs are clear to auscultation  Cardiovascular: regular rate and rhythm    Gastrointestinal:  abdomen is soft and non tender, no masses, bowel sounds normal, no flank tenderness  Neurological: II-XII grossly intact  no focal deficits  Skin: warm and dry, no rashes.  Musculoskeletal: neck is supple non tender        Extremities are symmetrical, full range of motion  Psychiatric: patient is pleasant, there is no agitation    DIFFERENTIAL DIAGNOSIS: After history and physical exam differential diagnosis was considered for  renal colic with probably urolithiasis vs. pyelo vs. enteritis        ED Course     Labs Reviewed   BASIC METABOLIC PANEL (8) - Abnormal; Notable for the following components:       Result Value    Glucose 142 (*)     BUN/CREA Ratio 21.5 (*)     Calculated Osmolality 299 (*)     All other components within normal limits   URINALYSIS WITH CULTURE REFLEX - Abnormal; Notable for the following components:    Clarity Urine Cloudy (*)     Ketones Urine Trace (*)     Blood Urine Large (*)      Protein Urine 100 mg/dL (*)     Nitrite Urine Positive (*)     Leukocyte Esterase Urine Trace (*)     RBC Urine 3-5 (*)     Bacteria Urine 3+ (*)     Squamous Epi. Cells Few (*)     Yeast Urine Present (*)     All other components within normal limits   UA MICROSCOPIC ONLY, URINE - Abnormal; Notable for the following components:    RBC Urine 3-5 (*)     Bacteria Urine 3+ (*)     Squamous Epi. Cells Few (*)     Yeast Urine Present (*)     All other components within normal limits   CBC WITH DIFFERENTIAL WITH PLATELET   RAINBOW DRAW LAVENDER   RAINBOW DRAW LIGHT GREEN   URINE CULTURE, ROUTINE       [facility]       Radiology Interpretation:  CT ABDOMEN+PELVIS KIDNEYSTONE 2D RNDR(NO IV,NO ORAL)(CPT=74176)    Result Date: 11/12/2024  CONCLUSION:   3 mm obstructing calculus within the proximal left ureter with mild left hydronephrosis.  Diverticulosis without diverticulitis.     Dictated by (CST): Mick Pavon MD on 11/12/2024 at 9:08 AM     Finalized by (CST): Mick Pavon MD on 11/12/2024 at 9:11 AM           I reviewed the CT and noted small stone l ureter.    Medical Decision Making  Pain improved with toradol urine cw infection iv rocephin given. Rx for abx sent dw patient need to return for fever, vomiting or any concerning symptoms.    Problems Addressed:  Acute cystitis without hematuria: acute illness or injury  Kidney stone: acute illness or injury    Amount and/or Complexity of Data Reviewed  Labs: ordered. Decision-making details documented in ED Course.  Radiology: ordered and independent interpretation performed. Decision-making details documented in ED Course.    Risk  OTC drugs.  Prescription drug management.       Disposition and Plan     Clinical Impression:  1. Kidney stone    2. Acute cystitis without hematuria        Disposition:  Discharge    Follow-up:  Joe Hutchison MD  [address] [address]  [phone]    Follow up        Medications Prescribed:  Current Discharge Medication List         START taking these medications    Details   Ketorolac Tromethamine 10 MG Oral Tab Take 1 tablet (10 mg total) by mouth every 6 (six) hours as needed for Pain.  Qty: 15 tablet, Refills: 0      ondansetron 4 MG Oral Tablet Dispersible Take 1 tablet (4 mg total) by mouth every 4 (four) hours as needed for Nausea.  Qty: 12 tablet, Refills: 0

## 2024-11-12 NOTE — ED INITIAL ASSESSMENT (HPI)
Patient aox3 to ed via private vehicle co of left flank pain radiating forward this am patient appears to be in severe pain

## (undated) DIAGNOSIS — D50.0 IRON DEFICIENCY ANEMIA DUE TO CHRONIC BLOOD LOSS: ICD-10-CM

## (undated) DIAGNOSIS — I10 PRIMARY HYPERTENSION: Primary | ICD-10-CM

## (undated) DIAGNOSIS — D25.9 UTERINE LEIOMYOMA, UNSPECIFIED LOCATION: ICD-10-CM

## (undated) DEVICE — SOL  .9 3000ML

## (undated) DEVICE — HYSTEROSCOPY: Brand: MEDLINE INDUSTRIES, INC.

## (undated) DEVICE — STERILE SURGICAL LUBRICANT, METAL TUBE: Brand: SURGILUBE

## (undated) DEVICE — MYOSURE SINGLE USE SEAL SET

## (undated) DEVICE — SET TUBI Y FL CNTRL INFL/OTFL

## (undated) DEVICE — ENCORE® LATEX ACCLAIM SIZE 6.5, STERILE LATEX POWDER-FREE SURGICAL GLOVE: Brand: ENCORE

## (undated) DEVICE — DEVICE SPEC RTRVL MYOSURE

## (undated) DEVICE — SOCK CNSTR 4IN TNPSL UNV SPEC

## (undated) NOTE — LETTER
8/23/2018              Mardorothy Angelica        [address] [place] [address]         Dear Ion Marquis,    This letter is to inform you that our office has made several attempts to reach you by phone without success. We were attempting to contact you by phone regarding an appointment you scheduled for 9/18/18 for elevated blood pressure. Please contact our office at the number listed below as soon as you receive this letter to discuss this issue and to make the necessary changes in our system to your contact information. Thank you for your cooperation.         Sincerely,    Rupinder Mata MD  [facility] [address]  [address] [facility] [address]   [phone]        Document electronically generated by:  Og Brown RN

## (undated) NOTE — LETTER
10/30/2019              Maryamber Expose        [address]         Dear Nicola Campoverde,    It was a pleasure to see you. Your mammogram was normal, repeat in one year. There is no need for further testing at this time.   I look forwar

## (undated) NOTE — ED AVS SNAPSHOT
Uma Alvarez   MRN: [id number]    Department:  [facility] Emergency Department   Date of Visit:  8/7/2017           Disclosure     Insurance plans vary and the physician(s) referred by the ER may not be covered by your plan.  Please contact y CARE PHYSICIAN AT ONCE OR RETURN IMMEDIATELY TO THE EMERGENCY DEPARTMENT. If you have been prescribed any medication(s), please fill your prescription right away and begin taking the medication(s) as directed.   If you believe that any of the medications